# Patient Record
Sex: FEMALE | Race: WHITE | NOT HISPANIC OR LATINO | Employment: OTHER | URBAN - METROPOLITAN AREA
[De-identification: names, ages, dates, MRNs, and addresses within clinical notes are randomized per-mention and may not be internally consistent; named-entity substitution may affect disease eponyms.]

---

## 2021-05-28 ENCOUNTER — RECORDS - HEALTHEAST (OUTPATIENT)
Dept: ADMINISTRATIVE | Facility: CLINIC | Age: 71
End: 2021-05-28

## 2024-06-01 ENCOUNTER — HOSPITAL ENCOUNTER (OUTPATIENT)
Facility: CLINIC | Age: 74
Setting detail: OBSERVATION
Discharge: HOME | End: 2024-06-02
Attending: EMERGENCY MEDICINE | Admitting: INTERNAL MEDICINE
Payer: COMMERCIAL

## 2024-06-01 DIAGNOSIS — E87.1 HYPONATREMIA: ICD-10-CM

## 2024-06-01 DIAGNOSIS — J06.9 VIRAL URI: ICD-10-CM

## 2024-06-01 DIAGNOSIS — I15.9 SECONDARY HYPERTENSION: Primary | ICD-10-CM

## 2024-06-01 LAB
ALBUMIN SERPL BCG-MCNC: 4.5 G/DL (ref 3.5–5.2)
ALBUMIN UR-MCNC: 10 MG/DL
ALP SERPL-CCNC: 106 U/L (ref 40–150)
ALT SERPL W P-5'-P-CCNC: 27 U/L (ref 0–50)
ANION GAP SERPL CALCULATED.3IONS-SCNC: 17 MMOL/L (ref 7–15)
APPEARANCE UR: CLEAR
AST SERPL W P-5'-P-CCNC: 36 U/L (ref 0–45)
BASOPHILS # BLD AUTO: 0 10E3/UL (ref 0–0.2)
BASOPHILS NFR BLD AUTO: 0 %
BILIRUB SERPL-MCNC: 0.4 MG/DL
BILIRUB UR QL STRIP: NEGATIVE
BUN SERPL-MCNC: 19.6 MG/DL (ref 8–23)
CALCIUM SERPL-MCNC: 10.6 MG/DL (ref 8.8–10.2)
CHLORIDE SERPL-SCNC: 84 MMOL/L (ref 98–107)
COLOR UR AUTO: YELLOW
CREAT SERPL-MCNC: 1.27 MG/DL (ref 0.51–0.95)
DEPRECATED HCO3 PLAS-SCNC: 21 MMOL/L (ref 22–29)
EGFRCR SERPLBLD CKD-EPI 2021: 45 ML/MIN/1.73M2
EOSINOPHIL # BLD AUTO: 0.1 10E3/UL (ref 0–0.7)
EOSINOPHIL NFR BLD AUTO: 1 %
ERYTHROCYTE [DISTWIDTH] IN BLOOD BY AUTOMATED COUNT: 12.5 % (ref 10–15)
FLUAV RNA SPEC QL NAA+PROBE: NEGATIVE
FLUBV RNA RESP QL NAA+PROBE: NEGATIVE
GLUCOSE SERPL-MCNC: 98 MG/DL (ref 70–99)
GLUCOSE UR STRIP-MCNC: NEGATIVE MG/DL
HCT VFR BLD AUTO: 43.7 % (ref 35–47)
HGB BLD-MCNC: 15.3 G/DL (ref 11.7–15.7)
HGB UR QL STRIP: NEGATIVE
HOLD SPECIMEN: NORMAL
HOLD SPECIMEN: NORMAL
HYALINE CASTS: 37 /LPF
IMM GRANULOCYTES # BLD: 0 10E3/UL
IMM GRANULOCYTES NFR BLD: 0 %
KETONES UR STRIP-MCNC: ABNORMAL MG/DL
LEUKOCYTE ESTERASE UR QL STRIP: ABNORMAL
LYMPHOCYTES # BLD AUTO: 1 10E3/UL (ref 0.8–5.3)
LYMPHOCYTES NFR BLD AUTO: 14 %
MAGNESIUM SERPL-MCNC: 2.1 MG/DL (ref 1.7–2.3)
MCH RBC QN AUTO: 30.4 PG (ref 26.5–33)
MCHC RBC AUTO-ENTMCNC: 35 G/DL (ref 31.5–36.5)
MCV RBC AUTO: 87 FL (ref 78–100)
MONOCYTES # BLD AUTO: 0.5 10E3/UL (ref 0–1.3)
MONOCYTES NFR BLD AUTO: 7 %
MUCOUS THREADS #/AREA URNS LPF: PRESENT /LPF
NEUTROPHILS # BLD AUTO: 5.6 10E3/UL (ref 1.6–8.3)
NEUTROPHILS NFR BLD AUTO: 78 %
NITRATE UR QL: NEGATIVE
NRBC # BLD AUTO: 0 10E3/UL
NRBC BLD AUTO-RTO: 0 /100
PH UR STRIP: 5 [PH] (ref 5–7)
PLATELET # BLD AUTO: 397 10E3/UL (ref 150–450)
POTASSIUM SERPL-SCNC: 5 MMOL/L (ref 3.4–5.3)
PROT SERPL-MCNC: 8.2 G/DL (ref 6.4–8.3)
RBC # BLD AUTO: 5.04 10E6/UL (ref 3.8–5.2)
RBC URINE: 1 /HPF
RSV RNA SPEC NAA+PROBE: NEGATIVE
SARS-COV-2 RNA RESP QL NAA+PROBE: NEGATIVE
SODIUM SERPL-SCNC: 122 MMOL/L (ref 135–145)
SODIUM SERPL-SCNC: 124 MMOL/L (ref 135–145)
SP GR UR STRIP: 1.02 (ref 1–1.03)
SQUAMOUS EPITHELIAL: 2 /HPF
TROPONIN T SERPL HS-MCNC: 20 NG/L
TROPONIN T SERPL HS-MCNC: 22 NG/L
TSH SERPL DL<=0.005 MIU/L-ACNC: 1.89 UIU/ML (ref 0.3–4.2)
UROBILINOGEN UR STRIP-MCNC: NORMAL MG/DL
WBC # BLD AUTO: 7.3 10E3/UL (ref 4–11)
WBC URINE: 8 /HPF

## 2024-06-01 PROCEDURE — G0378 HOSPITAL OBSERVATION PER HR: HCPCS

## 2024-06-01 PROCEDURE — 93005 ELECTROCARDIOGRAM TRACING: CPT

## 2024-06-01 PROCEDURE — 36415 COLL VENOUS BLD VENIPUNCTURE: CPT | Performed by: PHYSICIAN ASSISTANT

## 2024-06-01 PROCEDURE — 84484 ASSAY OF TROPONIN QUANT: CPT | Performed by: PHYSICIAN ASSISTANT

## 2024-06-01 PROCEDURE — 84443 ASSAY THYROID STIM HORMONE: CPT | Performed by: EMERGENCY MEDICINE

## 2024-06-01 PROCEDURE — 81001 URINALYSIS AUTO W/SCOPE: CPT | Performed by: EMERGENCY MEDICINE

## 2024-06-01 PROCEDURE — 258N000003 HC RX IP 258 OP 636: Performed by: EMERGENCY MEDICINE

## 2024-06-01 PROCEDURE — 36415 COLL VENOUS BLD VENIPUNCTURE: CPT | Performed by: EMERGENCY MEDICINE

## 2024-06-01 PROCEDURE — 99285 EMERGENCY DEPT VISIT HI MDM: CPT

## 2024-06-01 PROCEDURE — 84295 ASSAY OF SERUM SODIUM: CPT | Performed by: PHYSICIAN ASSISTANT

## 2024-06-01 PROCEDURE — 99222 1ST HOSP IP/OBS MODERATE 55: CPT | Performed by: PHYSICIAN ASSISTANT

## 2024-06-01 PROCEDURE — 84450 TRANSFERASE (AST) (SGOT): CPT | Performed by: EMERGENCY MEDICINE

## 2024-06-01 PROCEDURE — 84484 ASSAY OF TROPONIN QUANT: CPT | Performed by: EMERGENCY MEDICINE

## 2024-06-01 PROCEDURE — 85025 COMPLETE CBC W/AUTO DIFF WBC: CPT | Performed by: EMERGENCY MEDICINE

## 2024-06-01 PROCEDURE — 87637 SARSCOV2&INF A&B&RSV AMP PRB: CPT | Performed by: EMERGENCY MEDICINE

## 2024-06-01 PROCEDURE — 83735 ASSAY OF MAGNESIUM: CPT | Performed by: EMERGENCY MEDICINE

## 2024-06-01 RX ORDER — TEMAZEPAM 15 MG/1
15 CAPSULE ORAL
Status: DISCONTINUED | OUTPATIENT
Start: 2024-06-01 | End: 2024-06-02 | Stop reason: HOSPADM

## 2024-06-01 RX ORDER — ONDANSETRON 4 MG/1
4 TABLET, ORALLY DISINTEGRATING ORAL EVERY 6 HOURS PRN
Status: DISCONTINUED | OUTPATIENT
Start: 2024-06-01 | End: 2024-06-02 | Stop reason: HOSPADM

## 2024-06-01 RX ORDER — HYDROCORTISONE 25 MG/G
OINTMENT TOPICAL 2 TIMES DAILY PRN
COMMUNITY

## 2024-06-01 RX ORDER — LIDOCAINE 40 MG/G
CREAM TOPICAL
Status: DISCONTINUED | OUTPATIENT
Start: 2024-06-01 | End: 2024-06-02 | Stop reason: HOSPADM

## 2024-06-01 RX ORDER — AMOXICILLIN 250 MG
2 CAPSULE ORAL 2 TIMES DAILY PRN
Status: DISCONTINUED | OUTPATIENT
Start: 2024-06-01 | End: 2024-06-02 | Stop reason: HOSPADM

## 2024-06-01 RX ORDER — POLYETHYLENE GLYCOL 3350 17 G/17G
17 POWDER, FOR SOLUTION ORAL 2 TIMES DAILY PRN
Status: DISCONTINUED | OUTPATIENT
Start: 2024-06-01 | End: 2024-06-02 | Stop reason: HOSPADM

## 2024-06-01 RX ORDER — TEMAZEPAM 15 MG/1
1-2 CAPSULE ORAL AT BEDTIME
COMMUNITY
Start: 2024-03-15

## 2024-06-01 RX ORDER — MULTIPLE VITAMINS W/ MINERALS TAB 9MG-400MCG
1 TAB ORAL DAILY
COMMUNITY

## 2024-06-01 RX ORDER — LISINOPRIL 10 MG/1
10 TABLET ORAL DAILY
Status: DISCONTINUED | OUTPATIENT
Start: 2024-06-02 | End: 2024-06-02 | Stop reason: HOSPADM

## 2024-06-01 RX ORDER — ACETAMINOPHEN 325 MG/1
650 TABLET ORAL EVERY 4 HOURS PRN
Status: DISCONTINUED | OUTPATIENT
Start: 2024-06-01 | End: 2024-06-02 | Stop reason: HOSPADM

## 2024-06-01 RX ORDER — ACETAMINOPHEN 650 MG/1
650 SUPPOSITORY RECTAL EVERY 4 HOURS PRN
Status: DISCONTINUED | OUTPATIENT
Start: 2024-06-01 | End: 2024-06-02 | Stop reason: HOSPADM

## 2024-06-01 RX ORDER — ACETAMINOPHEN 325 MG/1
650 TABLET ORAL EVERY 6 HOURS PRN
COMMUNITY

## 2024-06-01 RX ORDER — LISINOPRIL/HYDROCHLOROTHIAZIDE 10-12.5 MG
1 TABLET ORAL DAILY
Status: ON HOLD | COMMUNITY
Start: 2024-03-14 | End: 2024-06-02

## 2024-06-01 RX ORDER — AMOXICILLIN 250 MG
1 CAPSULE ORAL 2 TIMES DAILY PRN
Status: DISCONTINUED | OUTPATIENT
Start: 2024-06-01 | End: 2024-06-02 | Stop reason: HOSPADM

## 2024-06-01 RX ORDER — TRIAMCINOLONE ACETONIDE 1 MG/G
OINTMENT TOPICAL 2 TIMES DAILY PRN
COMMUNITY

## 2024-06-01 RX ORDER — ONDANSETRON 2 MG/ML
4 INJECTION INTRAMUSCULAR; INTRAVENOUS EVERY 6 HOURS PRN
Status: DISCONTINUED | OUTPATIENT
Start: 2024-06-01 | End: 2024-06-02 | Stop reason: HOSPADM

## 2024-06-01 RX ADMIN — SODIUM CHLORIDE 1000 ML: 9 INJECTION, SOLUTION INTRAVENOUS at 16:00

## 2024-06-01 ASSESSMENT — ACTIVITIES OF DAILY LIVING (ADL)
ADLS_ACUITY_SCORE: 29
ADLS_ACUITY_SCORE: 29
ADLS_ACUITY_SCORE: 35
ADLS_ACUITY_SCORE: 29
ADLS_ACUITY_SCORE: 35
ADLS_ACUITY_SCORE: 35
ADLS_ACUITY_SCORE: 29
ADLS_ACUITY_SCORE: 29

## 2024-06-01 ASSESSMENT — COLUMBIA-SUICIDE SEVERITY RATING SCALE - C-SSRS
1. IN THE PAST MONTH, HAVE YOU WISHED YOU WERE DEAD OR WISHED YOU COULD GO TO SLEEP AND NOT WAKE UP?: NO
6. HAVE YOU EVER DONE ANYTHING, STARTED TO DO ANYTHING, OR PREPARED TO DO ANYTHING TO END YOUR LIFE?: NO
2. HAVE YOU ACTUALLY HAD ANY THOUGHTS OF KILLING YOURSELF IN THE PAST MONTH?: NO

## 2024-06-01 NOTE — PLAN OF CARE
"PRIMARY DIAGNOSIS: LOW SODIUM LEVELS  OUTPATIENT/OBSERVATION GOALS TO BE MET BEFORE DISCHARGE:  1. Pain Status: Pain free.    2. Return to near baseline physical activity: Yes    3. Cleared for discharge by consultants (if involved): No    Discharge Planner Nurse   Safe discharge environment identified: No  Barriers to discharge: Yes       Entered by: Ally Gonzalez RN 06/01/2024 6:44 PM     VSS, afeb. Pt. Arrived to floor from ED.Went to , had labs drawn and it was found that sodium level was low and pt.was directed to ED.Arrived to floor appx. 1800 this evening. IVF Dc'd as per order.Tele applied.Admit questions/ charting completed.Pt.A & O x4, lungs clear, + BS x 4 quads.Offers no c/o pain/discomfort.Is to have lab redraw this evening to ensure sodium levels are on the rise.Currently, pt. Resting comfortably in bed, offers no c/o.Will con't to monitor sensorium for changes,will monitor all appropriate labs.Will alert staff as needed.  Problem: Adult Inpatient Plan of Care  Goal: Plan of Care Review  Description: The Plan of Care Review/Shift note should be completed every shift.  The Outcome Evaluation is a brief statement about your assessment that the patient is improving, declining, or no change.  This information will be displayed automatically on your shift  note.  Outcome: Progressing  Flowsheets (Taken 6/1/2024 1830)  Plan of Care Reviewed With: patient  Overall Patient Progress: improving  Goal: Patient-Specific Goal (Individualized)  Description: You can add care plan individualizations to a care plan. Examples of Individualization might be:  \"Parent requests to be called daily at 9am for status\", \"I have a hard time hearing out of my right ear\", or \"Do not touch me to wake me up as it startles  me\".  Outcome: Progressing  Flowsheets (Taken 6/1/2024 1830)  Patient/Family-Specific Goals (Include Timeframe): Sodium to return to normal,all other electrolytes to remain WNL  Goal: Absence of " Hospital-Acquired Illness or Injury  Outcome: Progressing  Goal: Optimal Comfort and Wellbeing  Outcome: Progressing  Goal: Readiness for Transition of Care  Outcome: Not Progressing  Flowsheets (Taken 6/1/2024 1841)  Anticipated Changes Related to Illness: other (see comments)  Barriers to Discharge: Sodium to return to normal.  Intervention: Mutually Develop Transition Plan  Recent Flowsheet Documentation  Taken 6/1/2024 1841 by Ally Gonzalez RN  Anticipated Changes Related to Illness: other (see comments)       Please review provider order for any additional goals.   Nurse to notify provider when observation goals have been met and patient is ready for discharge.Goal Outcome Evaluation:      Plan of Care Reviewed With: patient    Overall Patient Progress: improvingOverall Patient Progress: improving

## 2024-06-01 NOTE — H&P
[facility]    History and Physical - Hospitalist Service       Date of Admission:  6/1/2024    Assessment & Plan      Narda Lr is a 73 year old female with PMHx significant for HTN, CKD and insomnia, who was admitted on 6/1/2024 with hyponatremia. She notes URI last week that has resolved.    1. Hyponatremia  Anion gap metabolic acidosis   Upper respiratory infection  Suspect due to polydipsia. Pt reports URI sx last week with low grade temps. She had been pushing fluids to help flush her cold but notes decreased solid food intake due to poor appetite. Also takes Lisinopril / hydrochlorothiazide for HTN. She developed lightheadedness today, and feels like her fever may have broke today. Denies chest pain or SOB  VSS. Na 122, Cr 1.27, BUN within normal ranges. Anion gap 17. Troponin slightly elevated at 20. CBC unremarkable. TSH within normal limits. UA is unremarkable. Covid, influenza and RSV negative. CXR clear. EKG SR with PVCs.  She was given 1L NS in the ED.  - admit to OBS  - fluid restrict at 1500 ml  - hold hydrochlorothiazide  - supportive cares  - recheck Na and troponin this evening at 2000  - recheck BMP in AM    2. HTN  Managed with Lisinopril / hydrochlorothiazide 10-12.5.   - resume Lisinopril, hold hydrochlorothiazide once med rec is complete    3. CKD, stage IIIb  Baseline Cr appears to run between 1.0-1.2. Cr 1.27 today. Given 1L NS in ED  - will hold off on further IVFs for now  - per above, 1500 ml fluid restriction  - recheck BMP in AM  - holding hydrochlorothiazide     4. Insomnia  - resume home melatonin and Restoril         Diet:  regular diet, 1500 ml fluid restriction  DVT Prophylaxis: Pneumatic Compression Devices  Aquino Catheter: Not present  Lines: None     Cardiac Monitoring: None  Code Status:  full code    Clinically Significant Risk Factors Present on Admission         # Hyponatremia: Lowest Na = 122 mmol/L in last 2 days, will monitor as appropriate   #  Hypercalcemia: Highest Ca = 10.6 mg/dL in last 2 days, will monitor as appropriate                        Disposition Plan     Medically Ready for Discharge: Anticipated Tomorrow         The patient's care was discussed with the Bedside Nurse, Patient, and ED provider, Dr. Castellano .    Earlene Mahan PA-C  Hospitalist Service  [facility]  Securely message with [employer] (more info)  Text page via [facility] Paging/Directory     ______________________________________________________________________    Chief Complaint   URI, low Na    History is obtained from the patient    History of Present Illness   Narda Lr is a 73 year old female with PMHx significant for HTN, CKD and insomnia, who presents to the ED with low sodium. She developed a cold/URI sx on Tuesday, sore throat and congestion. Developed low grade fevers on Thursday with mild cough. Today, she developed lightheadedness and noted diaphoresis and thought her fever broke. Her other URI sx have resolved. When she had a cold, she endorses increasing her water intake and notes a poor appetite so had poor solid food intake. Due to lightheadedness, she presented to Urgent Care. Her Na was noted to be 121 so she was directed to the ED. She denies chest pain, palpitations, SOB, abd pain, nausea, vomiting, diarrhea or dysuria.       Past Medical History    HTN    Past Surgical History   R CONCHIS  Tubal ligation  Tonsillectomy     Prior to Admission Medications   Prior to Admission Medications   Prescriptions Last Dose Informant Patient Reported? Taking?   acetaminophen (TYLENOL) 325 MG tablet Past Week at -  Yes Yes   Sig: Take 650 mg by mouth every 6 hours as needed for mild pain   hydrocortisone 2.5 % ointment 5/31/2024 at am  Yes Yes   Sig: Apply topically 2 times daily as needed   lisinopril-hydrochlorothiazide (ZESTORETIC) 10-12.5 MG tablet 6/1/2024 at am  Yes Yes   Sig: Take 1 tablet by mouth daily   multivitamin w/minerals (MULTI-VITAMIN)  tablet Past Week at -  Yes Yes   Sig: Take 1 tablet by mouth daily   temazepam (RESTORIL) 15 MG capsule 5/31/2024 at pm  Yes Yes   Sig: Take 1-2 capsules by mouth at bedtime   triamcinolone (KENALOG) 0.1 % external ointment 5/31/2024 at am  Yes Yes   Sig: Apply topically 2 times daily as needed for irritation      Facility-Administered Medications: None           Physical Exam   Vital Signs: Temp: 97.5  F (36.4  C) Temp src: Temporal BP: (!) 137/94 Pulse: 97   Resp: 18 SpO2: 97 % O2 Device: None (Room air)    Weight: 155 lbs 0 oz    GENERAL:  Comfortable.  PSYCH: pleasant, oriented, No acute distress.  HEENT:  Atraumatic, normocephalic. Normal conjunctiva, normal hearing, and oropharynx is normal.  NECK:  Supple, no neck vein distention  HEART:  Normal S1, S2 with no murmur, no pericardial rub, gallops or S3 or S4.  LUNGS:  Clear to auscultation, normal Respiratory effort. No wheezing, rales or ronchi.  GI:  Soft, normal bowel sounds. Non-tender, non distended.   EXTREMITIES:  No pedal edema, +2 pulses bilateral and equal.  SKIN:  Dry to touch, No rash, wound or ulcerations.  NEUROLOGIC:  CN 2-12 intact, BL 5/5 symmetric upper and lower extremity strength, sensation is intact with no focal deficits.      Medical Decision Making       60 MINUTES SPENT BY ME on the date of service doing chart review, history, exam, documentation & further activities per the note.      Data     I have personally reviewed the following data over the past 24 hrs:    7.3  \   15.3   / 397     122 (L) 84 (L) 19.6 /  98   5.0 21 (L) 1.27 (H) \     ALT: 27 AST: 36 AP: 106 TBILI: 0.4   ALB: 4.5 TOT PROTEIN: 8.2 LIPASE: N/A     Trop: 20 (H) BNP: N/A     TSH: 1.89 T4: N/A A1C: N/A       Imaging results reviewed over the past 24 hrs:   Recent Results (from the past 24 hour(s))   XR Chest 2 Views    Impression    COMPARISON:  3/15/2023.    FINDINGS: Normal cardiomediastinal silhouette and pulmonary vasculature. No acute airspace opacities. No  pneumothorax or pleural effusion. Degenerative change of the visualized osseous structures.

## 2024-06-01 NOTE — ED TRIAGE NOTES
Pt arrives with c/o sodium of 121 found on labs done at  today. Pt presented to  for a persistent cold since Tuesday that she is not improving from. Pt denies any dizziness or weakness. Pt does report she has been drinking a lot of water and juice to try and flush the cold out of her system.      Triage Assessment (Adult)       Row Name 06/01/24 6302          Triage Assessment    Airway WDL WDL        Respiratory WDL    Respiratory WDL WDL        Skin Circulation/Temperature WDL    Skin Circulation/Temperature WDL WDL        Cardiac WDL    Cardiac WDL WDL        Peripheral/Neurovascular WDL    Peripheral Neurovascular WDL WDL        Cognitive/Neuro/Behavioral WDL    Cognitive/Neuro/Behavioral WDL WDL

## 2024-06-01 NOTE — ED PROVIDER NOTES
"  Emergency Department Note      History of Present Illness     Chief Complaint  Abnormal Labs    HPI  Narda Lr is a 73 year old female with a history of hypertension and anemia presenting with abnormal labs. Narda has been battling a cold since Tuesday (5/28/24) that is not improving. She is experiencing a mild cough that produces clear phlegm. The patient reports some lightheadedness when standing up from bed and excessive diaphoresis. She presented to  this morning, where they reported her sodium levels was 121. She was referred to the ED. She underwent a chest XR, with no evidence of pneumonia. Patient feels good upon examination. She has been drinking a lot of fluids at home including water and orange juice. She denies vomiting, diarrhea fever, chest pain, abdominal pain, or difficulty breathing. No recent travel. No at-home Covid test.     Independent Historian  None    Review of External Notes  I reviewed the office visit note from  on 6/1/24. They did a XR that negatively resulted.     Past Medical History   Medical History and Problem List  Hypertension   Osteopenia   Insomnia  OA  Osteopenia   Anemia     Medications  Hydroxyzine   Hydroxyzine-hydrochlorothiazide   Temazepam     Surgical History   Tonsillectomy   Tubal ligation     Physical Exam   Patient Vitals for the past 24 hrs:   BP Temp Temp src Pulse Resp SpO2 Height Weight   06/01/24 1646 -- -- -- -- -- 98 % -- --   06/01/24 1645 (!) 157/79 -- -- 78 -- -- -- --   06/01/24 1357 (!) 137/94 97.5  F (36.4  C) Temporal 97 18 97 % 1.753 m (5' 9\") 70.3 kg (155 lb)     Physical Exam  Constitutional:       Appearance: She is well-developed.   HENT:      Right Ear: External ear normal.      Left Ear: External ear normal.      Mouth/Throat:      Mouth: Mucous membranes are moist.      Pharynx: Oropharynx is clear. No oropharyngeal exudate.   Eyes:      General: No scleral icterus.     Conjunctiva/sclera: Conjunctivae normal.      Pupils: Pupils are " equal, round, and reactive to light.   Cardiovascular:      Rate and Rhythm: Normal rate and regular rhythm.      Heart sounds: Normal heart sounds. No murmur heard.     No friction rub. No gallop.   Pulmonary:      Effort: Pulmonary effort is normal. No respiratory distress.      Breath sounds: Normal breath sounds. No wheezing or rales.   Abdominal:      General: Bowel sounds are normal. There is no distension.      Palpations: Abdomen is soft. There is no mass.      Tenderness: There is no abdominal tenderness.   Musculoskeletal:         General: Normal range of motion.   Skin:     General: Skin is warm and dry.      Capillary Refill: Capillary refill takes less than 2 seconds.      Findings: No rash.   Neurological:      General: No focal deficit present.      Mental Status: She is alert.      Cranial Nerves: No cranial nerve deficit.         Diagnostics   Lab Results   Labs Ordered and Resulted from Time of ED Arrival to Time of ED Departure   COMPREHENSIVE METABOLIC PANEL - Abnormal       Result Value    Sodium 122 (*)     Potassium 5.0      Carbon Dioxide (CO2) 21 (*)     Anion Gap 17 (*)     Urea Nitrogen 19.6      Creatinine 1.27 (*)     GFR Estimate 45 (*)     Calcium 10.6 (*)     Chloride 84 (*)     Glucose 98      Alkaline Phosphatase 106      AST 36      ALT 27      Protein Total 8.2      Albumin 4.5      Bilirubin Total 0.4     TROPONIN T, HIGH SENSITIVITY - Abnormal    Troponin T, High Sensitivity 20 (*)    ROUTINE UA WITH MICROSCOPIC REFLEX TO CULTURE - Abnormal    Color Urine Yellow      Appearance Urine Clear      Glucose Urine Negative      Bilirubin Urine Negative      Ketones Urine Trace (*)     Specific Gravity Urine 1.018      Blood Urine Negative      pH Urine 5.0      Protein Albumin Urine 10 (*)     Urobilinogen Urine Normal      Nitrite Urine Negative      Leukocyte Esterase Urine Trace (*)     Mucus Urine Present (*)     RBC Urine 1      WBC Urine 8 (*)     Squamous Epithelials Urine 2  (*)     Hyaline Casts Urine 37 (*)    MAGNESIUM - Normal    Magnesium 2.1     TSH WITH FREE T4 REFLEX - Normal    TSH 1.89     INFLUENZA A/B, RSV, & SARS-COV2 PCR - Normal    Influenza A PCR Negative      Influenza B PCR Negative      RSV PCR Negative      SARS CoV2 PCR Negative     CBC WITH PLATELETS AND DIFFERENTIAL    WBC Count 7.3      RBC Count 5.04      Hemoglobin 15.3      Hematocrit 43.7      MCV 87      MCH 30.4      MCHC 35.0      RDW 12.5      Platelet Count 397      % Neutrophils 78      % Lymphocytes 14      % Monocytes 7      % Eosinophils 1      % Basophils 0      % Immature Granulocytes 0      NRBCs per 100 WBC 0      Absolute Neutrophils 5.6      Absolute Lymphocytes 1.0      Absolute Monocytes 0.5      Absolute Eosinophils 0.1      Absolute Basophils 0.0      Absolute Immature Granulocytes 0.0      Absolute NRBCs 0.0       Imaging  No orders to display     EKG   ECG results from 06/01/24   EKG 12-lead, tracing only     Value    Systolic Blood Pressure     Diastolic Blood Pressure     Ventricular Rate 76    Atrial Rate 76    IN Interval 178    QRS Duration 80        QTc 420    P Axis 67    R AXIS -17    T Axis 59    Interpretation ECG      Sinus rhythm with Premature supraventricular complexes  Otherwise normal ECG  EKG interpreted by me at 1649     Independent Interpretation  None  ED Course    Medications Administered  Medications   sodium chloride 0.9% BOLUS 1,000 mL (1,000 mLs Intravenous $New Bag 6/1/24 1600)       Procedures  Procedures     Discussion of Management  Admitting Hospitalist, Dr. Ackerman    Social Determinants of Health adding to complexity of care  None    ED Course  ED Course as of 06/01/24 1652   Sat Jun 01, 2024   1553 I obtained the history and examined the patient as noted above.      1641 I spoke with Earlene Willis for Dr. Ackerman from the hospitalist service regarding the patient's presentation, findings here in the ED, and plan of care.       Medical Decision Making /  Diagnosis   CMS Diagnoses: None    MIPS     None    MDM  Narda Lr is a 73 year old female who presents with hyponatremia.  Repeat labs did confirm hyponatremia.  Her outside x-ray did not show any signs of pneumonia.  This was not repeated.  Viral swabs are negative.  Most like this is a viral illness.  She did receive a liter of fluids here.  She agreed to be admitted to correct her hyponatremia slowly.  Patient is admitted to the hospital service.    Disposition  The patient was admitted to the hospital.     ICD-10 Codes:    ICD-10-CM    1. Hyponatremia  E87.1       2. Viral URI  J06.9              Scribe Disclosure:  Gavi GAN, am serving as a scribe at 4:27 PM on 6/1/2024 to document services personally performed by Alise Castellano MD based on my observations and the provider's statements to me.        Alise Castellano MD  06/01/24 2009

## 2024-06-01 NOTE — PHARMACY-ADMISSION MEDICATION HISTORY
Pharmacy Intern Admission Medication History    Admission medication history is complete. The information provided in this note is only as accurate as the sources available at the time of the update.    Information Source(s): Patient and CareEverywhere/SureScripts via in-person    Pertinent Information: None    Changes made to PTA medication list:  Added: Whole list  Deleted: None  Changed: None    Allergies reviewed with patient and updates made in EHR: yes    Medication History Completed By: Mac Elise 6/1/2024 5:34 PM    PTA Med List   Medication Sig Last Dose    acetaminophen (TYLENOL) 325 MG tablet Take 650 mg by mouth every 6 hours as needed for mild pain Past Week at -    hydrocortisone 2.5 % ointment Apply topically 2 times daily as needed 5/31/2024 at am    lisinopril-hydrochlorothiazide (ZESTORETIC) 10-12.5 MG tablet Take 1 tablet by mouth daily 6/1/2024 at am    multivitamin w/minerals (MULTI-VITAMIN) tablet Take 1 tablet by mouth daily Past Week at -    temazepam (RESTORIL) 15 MG capsule Take 1-2 capsules by mouth at bedtime 5/31/2024 at pm    triamcinolone (KENALOG) 0.1 % external ointment Apply topically 2 times daily as needed for irritation 5/31/2024 at am

## 2024-06-01 NOTE — ED NOTES
"[facility]  ED Nurse Handoff Report    ED Chief complaint: Abnormal Labs  . ED Diagnosis:   Final diagnoses:   Hyponatremia   Viral URI       Allergies: No Known Allergies    Code Status: Full Code    Activity level - Baseline/Home:  independent.  Activity Level - Current:   independent.   Lift room needed: No.   Bariatric: No   Needed: No   Isolation: No.   Infection: Not Applicable.     Respiratory status: Room air    Vital Signs (within 30 minutes):   Vitals:    06/01/24 1357 06/01/24 1645 06/01/24 1646   BP: (!) 137/94 (!) 157/79    Pulse: 97 78    Resp: 18     Temp: 97.5  F (36.4  C)     TempSrc: Temporal     SpO2: 97%  98%   Weight: 70.3 kg (155 lb)     Height: 1.753 m (5' 9\")         Cardiac Rhythm:  ,      Pain level:    Patient confused: No.   Patient Falls Risk: patient and family education.   Elimination Status: Has voided     Patient Report - Initial Complaint: Low sodium.   Focused Assessment: A&O. C/O ongoing cold symptoms. Seen at  and sodium was 121 at that time. Pt denies symptoms at this time. Did have an episode of dizziness/feeling off balance for a brief time this morning and felt sweaty at that time as well. Denies pain. 1 L NS given.      Abnormal Results:   Labs Ordered and Resulted from Time of ED Arrival to Time of ED Departure   COMPREHENSIVE METABOLIC PANEL - Abnormal       Result Value    Sodium 122 (*)     Potassium 5.0      Carbon Dioxide (CO2) 21 (*)     Anion Gap 17 (*)     Urea Nitrogen 19.6      Creatinine 1.27 (*)     GFR Estimate 45 (*)     Calcium 10.6 (*)     Chloride 84 (*)     Glucose 98      Alkaline Phosphatase 106      AST 36      ALT 27      Protein Total 8.2      Albumin 4.5      Bilirubin Total 0.4     TROPONIN T, HIGH SENSITIVITY - Abnormal    Troponin T, High Sensitivity 20 (*)    ROUTINE UA WITH MICROSCOPIC REFLEX TO CULTURE - Abnormal    Color Urine Yellow      Appearance Urine Clear      Glucose Urine Negative      Bilirubin " Urine Negative      Ketones Urine Trace (*)     Specific Gravity Urine 1.018      Blood Urine Negative      pH Urine 5.0      Protein Albumin Urine 10 (*)     Urobilinogen Urine Normal      Nitrite Urine Negative      Leukocyte Esterase Urine Trace (*)     Mucus Urine Present (*)     RBC Urine 1      WBC Urine 8 (*)     Squamous Epithelials Urine 2 (*)     Hyaline Casts Urine 37 (*)    MAGNESIUM - Normal    Magnesium 2.1     TSH WITH FREE T4 REFLEX - Normal    TSH 1.89     INFLUENZA A/B, RSV, & SARS-COV2 PCR - Normal    Influenza A PCR Negative      Influenza B PCR Negative      RSV PCR Negative      SARS CoV2 PCR Negative     CBC WITH PLATELETS AND DIFFERENTIAL    WBC Count 7.3      RBC Count 5.04      Hemoglobin 15.3      Hematocrit 43.7      MCV 87      MCH 30.4      MCHC 35.0      RDW 12.5      Platelet Count 397      % Neutrophils 78      % Lymphocytes 14      % Monocytes 7      % Eosinophils 1      % Basophils 0      % Immature Granulocytes 0      NRBCs per 100 WBC 0      Absolute Neutrophils 5.6      Absolute Lymphocytes 1.0      Absolute Monocytes 0.5      Absolute Eosinophils 0.1      Absolute Basophils 0.0      Absolute Immature Granulocytes 0.0      Absolute NRBCs 0.0          No orders to display       Treatments provided: See MAR  Family Comments: NA  OBS brochure/video discussed/provided to patient:  Yes  ED Medications:   Medications   sodium chloride 0.9% BOLUS 1,000 mL (1,000 mLs Intravenous $New Bag 6/1/24 1600)       Drips infusing:  No  For the majority of the shift this patient was Green.   Interventions performed were NA.    Sepsis treatment initiated: No    Cares/treatment/interventions/medications to be completed following ED care: NA    ED Nurse Name: Reva Soriano RN  5:07 PM  RECEIVING UNIT ED HANDOFF REVIEW    Above ED Nurse Handoff Report was reviewed: Yes  Reviewed by: Ally Gonzalez RN on June 1, 2024 at 5:13 PM   MALIK Jerry called the ED to inform them the note was read: Yes

## 2024-06-02 VITALS
TEMPERATURE: 98.2 F | SYSTOLIC BLOOD PRESSURE: 133 MMHG | RESPIRATION RATE: 16 BRPM | HEART RATE: 83 BPM | DIASTOLIC BLOOD PRESSURE: 76 MMHG | HEIGHT: 69 IN | BODY MASS INDEX: 22.96 KG/M2 | OXYGEN SATURATION: 95 % | WEIGHT: 155 LBS

## 2024-06-02 LAB
ANION GAP SERPL CALCULATED.3IONS-SCNC: 16 MMOL/L (ref 7–15)
BUN SERPL-MCNC: 18.1 MG/DL (ref 8–23)
CALCIUM SERPL-MCNC: 9.6 MG/DL (ref 8.8–10.2)
CHLORIDE SERPL-SCNC: 93 MMOL/L (ref 98–107)
CREAT SERPL-MCNC: 1.01 MG/DL (ref 0.51–0.95)
DEPRECATED HCO3 PLAS-SCNC: 21 MMOL/L (ref 22–29)
EGFRCR SERPLBLD CKD-EPI 2021: 59 ML/MIN/1.73M2
GLUCOSE SERPL-MCNC: 86 MG/DL (ref 70–99)
POTASSIUM SERPL-SCNC: 4.3 MMOL/L (ref 3.4–5.3)
SODIUM SERPL-SCNC: 130 MMOL/L (ref 135–145)

## 2024-06-02 PROCEDURE — 250N000013 HC RX MED GY IP 250 OP 250 PS 637: Performed by: PHYSICIAN ASSISTANT

## 2024-06-02 PROCEDURE — 99238 HOSP IP/OBS DSCHRG MGMT 30/<: CPT | Performed by: INTERNAL MEDICINE

## 2024-06-02 PROCEDURE — G0378 HOSPITAL OBSERVATION PER HR: HCPCS

## 2024-06-02 PROCEDURE — 36415 COLL VENOUS BLD VENIPUNCTURE: CPT | Performed by: PHYSICIAN ASSISTANT

## 2024-06-02 PROCEDURE — 80048 BASIC METABOLIC PNL TOTAL CA: CPT | Performed by: PHYSICIAN ASSISTANT

## 2024-06-02 RX ORDER — LISINOPRIL 10 MG/1
10 TABLET ORAL DAILY
Qty: 30 TABLET | Refills: 0 | Status: SHIPPED | OUTPATIENT
Start: 2024-06-03 | End: 2024-07-03

## 2024-06-02 RX ADMIN — LISINOPRIL 10 MG: 10 TABLET ORAL at 07:47

## 2024-06-02 RX ADMIN — Medication 5 MG: at 00:15

## 2024-06-02 RX ADMIN — TEMAZEPAM 15 MG: 15 CAPSULE ORAL at 00:32

## 2024-06-02 ASSESSMENT — ACTIVITIES OF DAILY LIVING (ADL)
ADLS_ACUITY_SCORE: 29

## 2024-06-02 NOTE — DISCHARGE SUMMARY
[facility]  Hospitalist Discharge Summary      Date of Admission:  6/1/2024  Date of Discharge:  6/2/2024  Discharging Provider: Kenyon Ackerman MD  Discharge Service: Hospitalist Service    Discharge Diagnoses   Acute hyponatremia  Hypertension  Stage III CKD  Insomnia    Clinically Significant Risk Factors          Follow-ups Needed After Discharge   Follow-up Appointments     Follow-up and recommended labs and tests       Follow up with primary care provider, Migue Horton, within 7 days for   hospital follow- up.  The following labs/tests are recommended: BMP    Follow up on Sodium level as well as Blood pressure   .            Discharge Disposition   Discharged to home  Condition at discharge: Stable    Hospital Course   Narda Lr is a 73-year-old lady with known history of hypertension, CKD, insomnia who came to [facility] 6/1/2024 with symptoms of cold and cough that developed 4 days prior to her admission with sore throat and congestion because of which she started drinking more water then felt lightheaded and presented to urgent care where she was found to have a low sodium of 121 and referred to [facility] for admission.  She was given IV fluids with which her sodium has increased to 130 on the day of discharge.  I did advise her not to take hydrochlorothiazide and gave her prescriptions for lisinopril 10 mg daily to be continued.  Her sodium needs to be followed as outpatient and blood pressure monitored and medications adjusted accordingly.   She had trace of leukocyte esterase and 8 WBCs in the urine without any signs of urinary tract infection like increased frequency or burning micturition.  She should be closely monitored for acute UTI should she have any more symptoms of fever or increased frequency or of urination or burning micturition.    Consultations This Hospital Stay   None    Code Status   Full Code    Time Spent on this Encounter   I,  eKnyon Ackerman MD, personally saw the patient today and spent less than or equal to 30 minutes discharging this patient.       Kenyon Ackerman MD  [facility] OBSERVATION DEPT  [address] [facility] [address]  Phone: [phone]  ______________________________________________________________________    Physical Exam   Vital Signs: Temp: 98.2  F (36.8  C) Temp src: Oral BP: 133/76 Pulse: 83   Resp: 16 SpO2: 95 % O2 Device: None (Room air)    Weight: 155 lbs 0 oz    GENERAL: Patient does not look in any acute distress  HEENT: EOM+, Conjunctiva is clear   NECK: no Jugular Venous distention  HEART: S1 S2 regular  Rate and Rhythm,  no murmur,    LUNGS: Respirations are not laboured, Lungs are clear to auscultation without Crepitations or Wheezing   ABDOMEN: Soft, there is no tenderness,  Bowel Sounds are Positive   LOWER LIMBS: no Pedal Edema  Bilaterally   CNS:  Alert,  Oriented x 3, Moving all the Four Limbs          Primary Care Physician   Migue Horton    Discharge Orders      Reason for your hospital stay    who was admitted on 6/1/2024 with hyponatremia.     Follow-up and recommended labs and tests     Follow up with primary care provider, Migue Horton, within 7 days for hospital follow- up.  The following labs/tests are recommended: BMP    Follow up on Sodium level as well as Blood pressure   .     Activity    Your activity upon discharge: activity as tolerated     Diet    Follow this diet upon discharge: Orders Placed This Encounter      Fluid restriction 1500 ML FLUID      Regular Diet Adult       Significant Results and Procedures   Most Recent 3 CBC's:  Recent Labs   Lab Test 06/01/24  1555   WBC 7.3   HGB 15.3   MCV 87        Most Recent 3 BMP's:  Recent Labs   Lab Test 06/02/24 0619 06/01/24 1957 06/01/24  1555   * 124* 122*   POTASSIUM 4.3  --  5.0   CHLORIDE 93*  --  84*   CO2 21*  --  21*   BUN 18.1  --  19.6   CR 1.01*  --  1.27*   ANIONGAP 16*  --  17*   THOR 9.6   --  10.6*   [facility] 86  --  98   ,   Results for orders placed or performed in visit on 05/28/21   CT Misc Order    Narrative    See Historical Hospital Medical Record for documentation   XR Chest 2 Views    Narrative    See Historical Hospital Medical Record for documentation   CT Misc Order    Narrative    See Historical Hospital Medical Record for documentation       Discharge Medications   Current Discharge Medication List        START taking these medications    Details   lisinopril (ZESTRIL) 10 MG tablet Take 1 tablet (10 mg) by mouth daily for 30 days  Qty: 30 tablet, Refills: 0    Associated Diagnoses: Secondary hypertension           CONTINUE these medications which have NOT CHANGED    Details   acetaminophen (TYLENOL) 325 MG tablet Take 650 mg by mouth every 6 hours as needed for mild pain      hydrocortisone 2.5 % ointment Apply topically 2 times daily as needed      multivitamin w/minerals (MULTI-VITAMIN) tablet Take 1 tablet by mouth daily      temazepam (RESTORIL) 15 MG capsule Take 1-2 capsules by mouth at bedtime      triamcinolone (KENALOG) 0.1 % external ointment Apply topically 2 times daily as needed for irritation           STOP taking these medications       lisinopril-hydrochlorothiazide (ZESTORETIC) 10-12.5 MG tablet Comments:   Reason for Stopping:             Allergies   No Known Allergies

## 2024-06-02 NOTE — PLAN OF CARE
"Goal Outcome Evaluation:      Plan of Care Reviewed With: patient    Overall Patient Progress: improvingOverall Patient Progress: improving    Outcome Evaluation: Sodium is improving from 122 to 124. Patient reports feeling better.    PRIMARY DIAGNOSIS: Hyponatremia   OUTPATIENT/OBSERVATION GOALS TO BE MET BEFORE DISCHARGE:  ADLs back to baseline: Yes    Activity and level of assistance: Ambulating independently.    Pain status: Pain free.    Return to near baseline physical activity: Yes    Vitals are Temp: 97.5  F (36.4  C) Temp src: Temporal BP: (!) 161/87 Pulse: 76   Resp: 18 SpO2: 97 %.    Patient is Alert and Oriented x4. Denies any pain with interview. Patient is Saline locked. Tolerating  a Regular diet. She is independent with no assistive devices. Will Continue to monitor.      Discharge Planner Nurse   Safe discharge environment identified: Yes  Barriers to discharge: Yes       Entered by: Ewelina Manzo RN 06/01/2024 11:03 PM    Problem: Adult Inpatient Plan of Care  Goal: Plan of Care Review  Description: The Plan of Care Review/Shift note should be completed every shift.  The Outcome Evaluation is a brief statement about your assessment that the patient is improving, declining, or no change.  This information will be displayed automatically on your shift  note.  Outcome: Adequate for Care Transition  Flowsheets (Taken 6/1/2024 2301)  Outcome Evaluation: Sodium is improving from 122 to 124. Patient reports feeling better.  Plan of Care Reviewed With: patient  Overall Patient Progress: improving  Goal: Patient-Specific Goal (Individualized)  Description: You can add care plan individualizations to a care plan. Examples of Individualization might be:  \"Parent requests to be called daily at 9am for status\", \"I have a hard time hearing out of my right ear\", or \"Do not touch me to wake me up as it startles  me\".  Outcome: Adequate for Care Transition  Goal: Absence of Hospital-Acquired Illness or " Injury  Outcome: Adequate for Care Transition  Intervention: Identify and Manage Fall Risk  Recent Flowsheet Documentation  Taken 6/1/2024 2154 by Ewelina Manzo RN  Safety Promotion/Fall Prevention:   assistive device/personal items within reach   clutter free environment maintained   lighting adjusted   nonskid shoes/slippers when out of bed   safety round/check completed  Intervention: Prevent Skin Injury  Recent Flowsheet Documentation  Taken 6/1/2024 2154 by Ewelina Manzo RN  Body Position: position changed independently  Intervention: Prevent and Manage VTE (Venous Thromboembolism) Risk  Recent Flowsheet Documentation  Taken 6/1/2024 2154 by Ewelina Manzo RN  VTE Prevention/Management: SCDs (sequential compression devices) off  Intervention: Prevent Infection  Recent Flowsheet Documentation  Taken 6/1/2024 2154 by Ewelina Manzo RN  Infection Prevention:   rest/sleep promoted   hand hygiene promoted   personal protective equipment utilized  Goal: Optimal Comfort and Wellbeing  Outcome: Adequate for Care Transition  Goal: Readiness for Transition of Care  Outcome: Adequate for Care Transition     Please review provider order for any additional goals.   Nurse to notify provider when observation goals have been met and patient is ready for discharge.

## 2024-06-02 NOTE — PLAN OF CARE
"Goal Outcome Evaluation:      Plan of Care Reviewed With: patient    Overall Patient Progress: improvingOverall Patient Progress: improving    Outcome Evaluation: Sodium is improving from 122 to 124. Patient reports feeling better.    PRIMARY DIAGNOSIS: Hyponatremia   OUTPATIENT/OBSERVATION GOALS TO BE MET BEFORE DISCHARGE:  ADLs back to baseline: Yes    Activity and level of assistance: Ambulating independently.    Pain status: Pain free.    Return to near baseline physical activity: Yes    Vitals are Temp: 98.1  F (36.7  C) Temp src: Oral BP: 129/64 Pulse: 81   Resp: 16 SpO2: 98 %.    Patient is Alert and Oriented x4. Denies any pain with interview. Patient is Saline locked. Tolerating  a Regular diet. She is independent with no assistive devices. Plan is for Labs recheck this morning and patient wants to discharge if sodium is WNL.     Discharge Planner Nurse   Safe discharge environment identified: Yes  Barriers to discharge: Yes       Entered by: Ewelina Manzo RN 06/02/2024 5:32 AM    Problem: Adult Inpatient Plan of Care  Goal: Plan of Care Review  Description: The Plan of Care Review/Shift note should be completed every shift.  The Outcome Evaluation is a brief statement about your assessment that the patient is improving, declining, or no change.  This information will be displayed automatically on your shift  note.  Outcome: Adequate for Care Transition  Flowsheets (Taken 6/1/2024 2301)  Outcome Evaluation: Sodium is improving from 122 to 124. Patient reports feeling better.  Plan of Care Reviewed With: patient  Overall Patient Progress: improving  Goal: Patient-Specific Goal (Individualized)  Description: You can add care plan individualizations to a care plan. Examples of Individualization might be:  \"Parent requests to be called daily at 9am for status\", \"I have a hard time hearing out of my right ear\", or \"Do not touch me to wake me up as it startles  me\".  Outcome: Adequate for Care Transition  Goal: " Absence of Hospital-Acquired Illness or Injury  Outcome: Adequate for Care Transition  Intervention: Identify and Manage Fall Risk  Recent Flowsheet Documentation  Taken 6/1/2024 2154 by Ewelina Manzo RN  Safety Promotion/Fall Prevention:   assistive device/personal items within reach   clutter free environment maintained   lighting adjusted   nonskid shoes/slippers when out of bed   safety round/check completed  Intervention: Prevent Skin Injury  Recent Flowsheet Documentation  Taken 6/1/2024 2154 by Ewelina Manzo RN  Body Position: position changed independently  Intervention: Prevent and Manage VTE (Venous Thromboembolism) Risk  Recent Flowsheet Documentation  Taken 6/1/2024 2154 by Ewelina Manzo RN  VTE Prevention/Management: SCDs (sequential compression devices) off  Intervention: Prevent Infection  Recent Flowsheet Documentation  Taken 6/1/2024 2154 by Ewelina Manzo RN  Infection Prevention:   rest/sleep promoted   hand hygiene promoted   personal protective equipment utilized  Goal: Optimal Comfort and Wellbeing  Outcome: Adequate for Care Transition  Goal: Readiness for Transition of Care  Outcome: Adequate for Care Transition     Please review provider order for any additional goals.   Nurse to notify provider when observation goals have been met and patient is ready for discharge.

## 2024-06-02 NOTE — PLAN OF CARE
"Dx: Hyponatremia    /76 (BP Location: Left arm)   Pulse 83   Temp 98.2  F (36.8  C) (Oral)   Resp 16   Ht 1.753 m (5' 9\")   Wt 70.3 kg (155 lb)   SpO2 95%   BMI 22.89 kg/m       Patient's After Visit Summary was reviewed with patient.   Patient verbalized understanding of After Visit Summary, recommended follow up and was given an opportunity to ask questions.   Discharge medications sent home with patient/family: No   Discharged with other: self      Problem: Adult Inpatient Plan of Care  Goal: Plan of Care Review  Description: The Plan of Care Review/Shift note should be completed every shift.  The Outcome Evaluation is a brief statement about your assessment that the patient is improving, declining, or no change.  This information will be displayed automatically on your shift  note.  6/2/2024 1416 by Arielle Brunson RN  Outcome: Met  Flowsheets (Taken 6/2/2024 1416)  Outcome Evaluation: Plan to discharge home  Plan of Care Reviewed With: patient  Overall Patient Progress: improving  6/2/2024 1036 by Arielle Brunson RN  Outcome: Progressing  Flowsheets (Taken 6/2/2024 1036)  Outcome Evaluation: Sodium lab level 130.  Plan of Care Reviewed With: patient  Overall Patient Progress: improving  Goal: Patient-Specific Goal (Individualized)  Description: You can add care plan individualizations to a care plan. Examples of Individualization might be:  \"Parent requests to be called daily at 9am for status\", \"I have a hard time hearing out of my right ear\", or \"Do not touch me to wake me up as it startles  me\".  6/2/2024 1416 by Arielle Brunson RN  Outcome: Met  6/2/2024 1036 by Arielle Brunson RN  Outcome: Progressing  Goal: Absence of Hospital-Acquired Illness or Injury  6/2/2024 1416 by Arielle Brunson RN  Outcome: Met  6/2/2024 1036 by Arielle Brunson, RN  Outcome: Progressing  Intervention: Identify and Manage Fall Risk  Recent Flowsheet Documentation  Taken 6/2/2024 1024 by Arielle Brunson, " RN  Safety Promotion/Fall Prevention:   activity supervised   nonskid shoes/slippers when out of bed  Intervention: Prevent Infection  Recent Flowsheet Documentation  Taken 6/2/2024 1024 by Arielle Brunson RN  Infection Prevention: hand hygiene promoted  Goal: Optimal Comfort and Wellbeing  6/2/2024 1416 by Arielle Brunson RN  Outcome: Met  6/2/2024 1036 by Arielle Brunson RN  Outcome: Progressing  Goal: Readiness for Transition of Care  6/2/2024 1416 by Arielle Brunson RN  Outcome: Met  6/2/2024 1036 by Arielle Brunson RN  Outcome: Progressing           Goal Outcome Evaluation:      Plan of Care Reviewed With: patient    Overall Patient Progress: improvingOverall Patient Progress: improving    Outcome Evaluation: Plan to discharge home

## 2024-06-02 NOTE — PLAN OF CARE
"Dx: Hyponatremia    /83 (BP Location: Left arm)   Pulse 85   Temp 98.4  F (36.9  C) (Oral)   Resp 16   Ht 1.753 m (5' 9\")   Wt 70.3 kg (155 lb)   SpO2 97%   BMI 22.89 kg/m       A&O x4. Independent in room. Regular diet tolerated. 1500 ML fluid restriction. Reporting no pain at this time. IV saline locked. Sodium level 130. Bed alarm off and call light within reach.       Problem: Adult Inpatient Plan of Care  Goal: Plan of Care Review  Description: The Plan of Care Review/Shift note should be completed every shift.  The Outcome Evaluation is a brief statement about your assessment that the patient is improving, declining, or no change.  This information will be displayed automatically on your shift  note.  Outcome: Progressing  Flowsheets (Taken 6/2/2024 1036)  Outcome Evaluation: Sodium lab level 130.  Plan of Care Reviewed With: patient  Overall Patient Progress: improving  Goal: Patient-Specific Goal (Individualized)  Description: You can add care plan individualizations to a care plan. Examples of Individualization might be:  \"Parent requests to be called daily at 9am for status\", \"I have a hard time hearing out of my right ear\", or \"Do not touch me to wake me up as it startles  me\".  Outcome: Progressing  Goal: Absence of Hospital-Acquired Illness or Injury  Outcome: Progressing  Intervention: Identify and Manage Fall Risk  Recent Flowsheet Documentation  Taken 6/2/2024 1024 by Arielle Brunson, RN  Safety Promotion/Fall Prevention:   activity supervised   nonskid shoes/slippers when out of bed  Intervention: Prevent Infection  Recent Flowsheet Documentation  Taken 6/2/2024 1024 by Arielle Brunson, RN  Infection Prevention: hand hygiene promoted  Goal: Optimal Comfort and Wellbeing  Outcome: Progressing  Goal: Readiness for Transition of Care  Outcome: Progressing       Goal Outcome Evaluation:      Plan of Care Reviewed With: patient    Overall Patient Progress: improvingOverall Patient " Progress: improving    Outcome Evaluation: Sodium lab level 130.

## 2024-06-02 NOTE — PLAN OF CARE
"Goal Outcome Evaluation:      Plan of Care Reviewed With: patient    Overall Patient Progress: improvingOverall Patient Progress: improving    Outcome Evaluation: Sodium is improving from 122 to 124. Patient reports feeling better.    PRIMARY DIAGNOSIS: Hyponatremia   OUTPATIENT/OBSERVATION GOALS TO BE MET BEFORE DISCHARGE:  ADLs back to baseline: Yes    Activity and level of assistance: Ambulating independently.    Pain status: Pain free.    Return to near baseline physical activity: Yes    Vitals are Temp: 98.1  F (36.7  C) Temp src: Oral BP: 128/80 Pulse: 74   Resp: 16 SpO2: 93 %.    Patient is Alert and Oriented x4. Denies any pain with interview. Patient is Saline locked. Tolerating  a Regular diet. She is independent with no assistive devices. Reports being unable to sleep. PRN sleeping meds given per patient request and she seems to be resting comfortably at this time. Will Continue to monitor.      Discharge Planner Nurse   Safe discharge environment identified: Yes  Barriers to discharge: Yes       Entered by: Ewelina Manzo RN 06/02/2024 2:21 AM    Problem: Adult Inpatient Plan of Care  Goal: Plan of Care Review  Description: The Plan of Care Review/Shift note should be completed every shift.  The Outcome Evaluation is a brief statement about your assessment that the patient is improving, declining, or no change.  This information will be displayed automatically on your shift  note.  Outcome: Adequate for Care Transition  Flowsheets (Taken 6/1/2024 2301)  Outcome Evaluation: Sodium is improving from 122 to 124. Patient reports feeling better.  Plan of Care Reviewed With: patient  Overall Patient Progress: improving  Goal: Patient-Specific Goal (Individualized)  Description: You can add care plan individualizations to a care plan. Examples of Individualization might be:  \"Parent requests to be called daily at 9am for status\", \"I have a hard time hearing out of my right ear\", or \"Do not touch me to wake me up as " "it startles  me\".  Outcome: Adequate for Care Transition  Goal: Absence of Hospital-Acquired Illness or Injury  Outcome: Adequate for Care Transition  Intervention: Identify and Manage Fall Risk  Recent Flowsheet Documentation  Taken 6/1/2024 2154 by Ewelina Manzo RN  Safety Promotion/Fall Prevention:   assistive device/personal items within reach   clutter free environment maintained   lighting adjusted   nonskid shoes/slippers when out of bed   safety round/check completed  Intervention: Prevent Skin Injury  Recent Flowsheet Documentation  Taken 6/1/2024 2154 by Ewelina Manzo RN  Body Position: position changed independently  Intervention: Prevent and Manage VTE (Venous Thromboembolism) Risk  Recent Flowsheet Documentation  Taken 6/1/2024 2154 by Ewelina Manzo RN  VTE Prevention/Management: SCDs (sequential compression devices) off  Intervention: Prevent Infection  Recent Flowsheet Documentation  Taken 6/1/2024 2154 by Ewelina Manzo RN  Infection Prevention:   rest/sleep promoted   hand hygiene promoted   personal protective equipment utilized  Goal: Optimal Comfort and Wellbeing  Outcome: Adequate for Care Transition  Goal: Readiness for Transition of Care  Outcome: Adequate for Care Transition     Please review provider order for any additional goals.   Nurse to notify provider when observation goals have been met and patient is ready for discharge.  "

## 2024-06-03 LAB
ATRIAL RATE - MUSE: 76 BPM
DIASTOLIC BLOOD PRESSURE - MUSE: NORMAL MMHG
INTERPRETATION ECG - MUSE: NORMAL
P AXIS - MUSE: 67 DEGREES
PR INTERVAL - MUSE: 178 MS
QRS DURATION - MUSE: 80 MS
QT - MUSE: 374 MS
QTC - MUSE: 420 MS
R AXIS - MUSE: -17 DEGREES
SYSTOLIC BLOOD PRESSURE - MUSE: NORMAL MMHG
T AXIS - MUSE: 59 DEGREES
VENTRICULAR RATE- MUSE: 76 BPM